# Patient Record
(demographics unavailable — no encounter records)

---

## 2024-11-25 NOTE — ASSESSMENT
[FreeTextEntry1] : Discussed diagnosis and treatment plan including PT. take nsaids otc prn  f/u 1 month

## 2024-11-25 NOTE — PHYSICAL EXAM
[FreeTextEntry1] : CAITIE is a 34 year old female  Constitutional: healthy appearing, NAD, and normal body habitus  LUMBAR ROM: flexion to 30 deg  Gait: normal  Inspection: no erythema, warmth Spine: no TTP in spinous process Bony palpation: no TTP in GT  Soft tissue palpation hip: no TTP in gluteus tatum Soft tissue palpation of spine: no TTP in lumbar paraspinals  5/5 bilateral KE, DF, PF  sensation intact in bilat LE reflexes: knee and ankle 2+  Special tests: neg seated SLR  Constitutional: healthy appearing, NAD, and normal body habitus  NECK ROM: flexion to 30, ext to 30, rotation to 60 deg bilat  Inspection: no erythema, warmth Spine: no TTP in spinous process Soft tissue palpation: no TTP in cervical paraspinals, rhomboids, trapezius  5/5 bilateral elb flex/ext, WE, finger abd/flex sensation intact in bilat UE reflexes:  biceps and triceps  Special tests: neg Spurling, Sinha
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
## 2024-11-25 NOTE — DATA REVIEWED
[FreeTextEntry1] : office xrays of lumbar spine ap and lateral show mild tilt to right.  No gross fractures.   office xrays of cervical ap and lateral shows mild facet oa at C2-5.  No gross fractures.

## 2025-03-20 NOTE — ASSESSMENT
[FreeTextEntry1] : CPE- Well exam, comprehensive labs ordered. STD panel sent. PAP up to date. Follow up labs, drawn today.

## 2025-03-20 NOTE — PHYSICAL EXAM
[No Acute Distress] : no acute distress [Well Nourished] : well nourished [Well Developed] : well developed [Well-Appearing] : well-appearing [Normal Sclera/Conjunctiva] : normal sclera/conjunctiva [PERRL] : pupils equal round and reactive to light [EOMI] : extraocular movements intact [Normal Outer Ear/Nose] : the outer ears and nose were normal in appearance [Normal Oropharynx] : the oropharynx was normal [No JVD] : no jugular venous distention [No Lymphadenopathy] : no lymphadenopathy [Supple] : supple [Thyroid Normal, No Nodules] : the thyroid was normal and there were no nodules present [No Respiratory Distress] : no respiratory distress  [No Accessory Muscle Use] : no accessory muscle use [Clear to Auscultation] : lungs were clear to auscultation bilaterally [Normal Rate] : normal rate  [Regular Rhythm] : with a regular rhythm [Normal S1, S2] : normal S1 and S2 [No Murmur] : no murmur heard [No Abdominal Bruit] : a ~M bruit was not heard ~T in the abdomen [No Varicosities] : no varicosities [No Edema] : there was no peripheral edema [No Palpable Aorta] : no palpable aorta [No Extremity Clubbing/Cyanosis] : no extremity clubbing/cyanosis [Soft] : abdomen soft [Non Tender] : non-tender [Non-distended] : non-distended [No HSM] : no HSM [Normal Bowel Sounds] : normal bowel sounds [Normal Posterior Cervical Nodes] : no posterior cervical lymphadenopathy [Normal Anterior Cervical Nodes] : no anterior cervical lymphadenopathy [No CVA Tenderness] : no CVA  tenderness [No Spinal Tenderness] : no spinal tenderness [No Joint Swelling] : no joint swelling [Grossly Normal Strength/Tone] : grossly normal strength/tone [No Rash] : no rash [Coordination Grossly Intact] : coordination grossly intact [No Focal Deficits] : no focal deficits [Normal Gait] : normal gait [Normal Affect] : the affect was normal [Normal Insight/Judgement] : insight and judgment were intact [Normal Appearance] : normal in appearance [No Masses] : no palpable masses [No Nipple Discharge] : no nipple discharge [No Axillary Lymphadenopathy] : no axillary lymphadenopathy
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
## 2025-03-20 NOTE — HEALTH RISK ASSESSMENT
[Fair] :  ~his/her~ mood as fair [0] : 1) Little interest or pleasure doing things: Not at all (0) [1] : 2) Feeling down, depressed, or hopeless for several days (1) [PHQ-2 Negative - No further assessment needed] : PHQ-2 Negative - No further assessment needed [Patient reported PAP Smear was normal] : Patient reported PAP Smear was normal [HIV Test offered] : HIV Test offered [Hepatitis C test offered] : Hepatitis C test offered [Never] : Never [With Family] : lives with family [Unemployed] : unemployed [[id number]] : 1 [PapSmearDate] : 12/25 [de-identified] : mom

## 2025-03-20 NOTE — CURRENT MEDS
[Takes medication as prescribed] : takes [None] : Patient does not have any barriers to medication adherence [Benzodiazepines] : benzodiazepines [Opioids] : opioids [Blood Thinners] : blood thinners